# Patient Record
Sex: FEMALE | Race: OTHER | URBAN - METROPOLITAN AREA
[De-identification: names, ages, dates, MRNs, and addresses within clinical notes are randomized per-mention and may not be internally consistent; named-entity substitution may affect disease eponyms.]

---

## 2019-03-09 ENCOUNTER — OFFICE VISIT (OUTPATIENT)
Dept: FAMILY MEDICINE CLINIC | Age: 54
End: 2019-03-09

## 2019-03-09 ENCOUNTER — HOSPITAL ENCOUNTER (OUTPATIENT)
Dept: LAB | Age: 54
Discharge: HOME | End: 2019-03-09

## 2019-03-09 VITALS
WEIGHT: 121 LBS | HEIGHT: 55 IN | TEMPERATURE: 98.1 F | BODY MASS INDEX: 28 KG/M2 | SYSTOLIC BLOOD PRESSURE: 162 MMHG | HEART RATE: 58 BPM | DIASTOLIC BLOOD PRESSURE: 75 MMHG

## 2019-03-09 DIAGNOSIS — R03.0 BLOOD PRESSURE ELEVATED WITHOUT HISTORY OF HTN: Primary | ICD-10-CM

## 2019-03-09 DIAGNOSIS — R03.0 BLOOD PRESSURE ELEVATED WITHOUT HISTORY OF HTN: ICD-10-CM

## 2019-03-09 DIAGNOSIS — H26.9 CATARACT OF BOTH EYES, UNSPECIFIED CATARACT TYPE: ICD-10-CM

## 2019-03-09 DIAGNOSIS — Z13.9 ENCOUNTER FOR SCREENING: ICD-10-CM

## 2019-03-09 LAB
ALBUMIN SERPL-MCNC: 3.8 G/DL (ref 3.5–5)
ALBUMIN/GLOB SERPL: 1 {RATIO} (ref 1.1–2.2)
ALP SERPL-CCNC: 195 U/L (ref 45–117)
ALT SERPL-CCNC: 56 U/L (ref 12–78)
ANION GAP SERPL CALC-SCNC: 7 MMOL/L (ref 5–15)
AST SERPL-CCNC: 49 U/L (ref 15–37)
BASOPHILS # BLD: 0.1 K/UL (ref 0–0.1)
BASOPHILS NFR BLD: 1 % (ref 0–1)
BILIRUB SERPL-MCNC: 0.3 MG/DL (ref 0.2–1)
BUN SERPL-MCNC: 11 MG/DL (ref 6–20)
BUN/CREAT SERPL: 14 (ref 12–20)
CALCIUM SERPL-MCNC: 9.2 MG/DL (ref 8.5–10.1)
CHLORIDE SERPL-SCNC: 107 MMOL/L (ref 97–108)
CHOLEST SERPL-MCNC: 264 MG/DL
CO2 SERPL-SCNC: 27 MMOL/L (ref 21–32)
CREAT SERPL-MCNC: 0.77 MG/DL (ref 0.55–1.02)
DIFFERENTIAL METHOD BLD: NORMAL
EOSINOPHIL # BLD: 0.2 K/UL (ref 0–0.4)
EOSINOPHIL NFR BLD: 2 % (ref 0–7)
ERYTHROCYTE [DISTWIDTH] IN BLOOD BY AUTOMATED COUNT: 12.4 % (ref 11.5–14.5)
EST. AVERAGE GLUCOSE BLD GHB EST-MCNC: 148 MG/DL
GLOBULIN SER CALC-MCNC: 3.8 G/DL (ref 2–4)
GLUCOSE POC: NORMAL MG/DL
GLUCOSE SERPL-MCNC: 114 MG/DL (ref 65–100)
HBA1C MFR BLD: 6.8 % (ref 4.2–6.3)
HCT VFR BLD AUTO: 41.5 % (ref 35–47)
HDLC SERPL-MCNC: 41 MG/DL
HDLC SERPL: 6.4 {RATIO} (ref 0–5)
HGB BLD-MCNC: 13.7 G/DL (ref 11.5–16)
HGB BLD-MCNC: 14.2 G/DL
IMM GRANULOCYTES # BLD AUTO: 0 K/UL (ref 0–0.04)
IMM GRANULOCYTES NFR BLD AUTO: 0 % (ref 0–0.5)
LDLC SERPL CALC-MCNC: 148.8 MG/DL (ref 0–100)
LIPID PROFILE,FLP: ABNORMAL
LYMPHOCYTES # BLD: 3.4 K/UL (ref 0.8–3.5)
LYMPHOCYTES NFR BLD: 31 % (ref 12–49)
MCH RBC QN AUTO: 32 PG (ref 26–34)
MCHC RBC AUTO-ENTMCNC: 33 G/DL (ref 30–36.5)
MCV RBC AUTO: 97 FL (ref 80–99)
MONOCYTES # BLD: 0.6 K/UL (ref 0–1)
MONOCYTES NFR BLD: 6 % (ref 5–13)
NEUTS SEG # BLD: 6.5 K/UL (ref 1.8–8)
NEUTS SEG NFR BLD: 60 % (ref 32–75)
NRBC # BLD: 0 K/UL (ref 0–0.01)
NRBC BLD-RTO: 0 PER 100 WBC
PLATELET # BLD AUTO: 369 K/UL (ref 150–400)
PMV BLD AUTO: 9.8 FL (ref 8.9–12.9)
POTASSIUM SERPL-SCNC: 4 MMOL/L (ref 3.5–5.1)
PROT SERPL-MCNC: 7.6 G/DL (ref 6.4–8.2)
RBC # BLD AUTO: 4.28 M/UL (ref 3.8–5.2)
SODIUM SERPL-SCNC: 141 MMOL/L (ref 136–145)
TRIGL SERPL-MCNC: 371 MG/DL (ref ?–150)
TSH SERPL DL<=0.05 MIU/L-ACNC: 2.6 UIU/ML (ref 0.36–3.74)
VLDLC SERPL CALC-MCNC: 74.2 MG/DL
WBC # BLD AUTO: 10.8 K/UL (ref 3.6–11)

## 2019-03-09 PROCEDURE — 85025 COMPLETE CBC W/AUTO DIFF WBC: CPT

## 2019-03-09 PROCEDURE — 80061 LIPID PANEL: CPT

## 2019-03-09 PROCEDURE — 84443 ASSAY THYROID STIM HORMONE: CPT

## 2019-03-09 PROCEDURE — 83036 HEMOGLOBIN GLYCOSYLATED A1C: CPT

## 2019-03-09 PROCEDURE — 80053 COMPREHEN METABOLIC PANEL: CPT

## 2019-03-09 NOTE — PROGRESS NOTES
Gerson Castaneda is a 48 y.o. female    Issues discussed today include:    Chief Complaint   Patient presents with    Eye Problem     Blurry    Cold Symptoms     sore throat x3 days        1) Vision problem:  Has had blurry vision for 4 yrs, worsening. Was told she has cataracts in the past about 4 yrs ago, thinks it is \"growing. \" She had eye exam in [place], but did not have surgery. Denies eye pain or purulent discharge. Eyes do water, just started a little while ago. Is not using any eye drops or other meds for this. Has never needed glasses, not using contact lenses. 2) Sore throat:  Started 3 days ago. Associated with body aches. No known sick contacts. Denies HA, fever, nasal congestion, cough. Is eating and drinking normally. 3) Elevated BP:  Noted today. Has never before been dx'd with HTN - but only went to doctor once 4 yrs ago. No known family h/o HTN. Denies CP, SOB, leg swelling. Says her   4 yrs ago and has been feeling sad since. She has a will to live, denies SI. Is not working right now, lives with her daughter. Goes to [employer], feels she has good support network. Data reviewed or ordered today:       Other problems include: There is no problem list on file for this patient. Medications:  No current outpatient medications on file prior to visit. No current facility-administered medications on file prior to visit. Allergies:  No Known Allergies    LMP:  No LMP recorded.  Patient is postmenopausal.    Social History     Socioeconomic History    Marital status: SINGLE     Spouse name: Not on file    Number of children: Not on file    Years of education: Not on file    Highest education level: Not on file   Social Needs    Financial resource strain: Not on file    Food insecurity - worry: Not on file    Food insecurity - inability: Not on file    Transportation needs - medical: Not on file    Transportation needs - non-medical: Not on file Occupational History    Not on file   Tobacco Use    Smoking status: Never Smoker    Smokeless tobacco: Never Used   Substance and Sexual Activity    Alcohol use: No     Frequency: Never    Drug use: No    Sexual activity: Not on file   Other Topics Concern    Not on file   Social History Narrative    Not on file       No family history on file. Physical Exam   Visit Vitals  /75 (BP 1 Location: Left arm)   Pulse (!) 58   Temp 98.1 °F (36.7 °C) (Oral)   Ht 4' 6.13\" (1.375 m)   Wt 121 lb (54.9 kg)   BMI 29.03 kg/m²      BP Readings from Last 3 Encounters:   03/09/19 162/75     Constitutional: Appears well,  No acute distress, Vitals noted  Psychiatric:  Affect normal, Alert and Oriented to person/place/time  Eyes:  Conjunctiva clear aside from pterygium in left eye, no drainage, PERRL, EOMI, eyelids normal without edema, bilat lenses with clouding  ENT:  External ears and nose normal, Mucous membranes moist. TMs grey and non-bulging bilaterally. OP without erythema, edema or exudate. Bilateral nares without active drainage, edema or polyps. Neck:  General inspection normal. Supple. Heart:  Normal HR, Normal S1 and S2,  Regular rhythm. No murmurs, rubs or gallops. Lungs:  Clear to auscultation, good respiratory effort, no wheezes, rales or rhonchi  Extremities: Without edema, good peripheral pulses  Skin:  Warm to palpation, without rashes      Assessment/Plan:      ICD-10-CM ICD-9-CM    1. Blood pressure elevated without history of HTN R03.0 796.2 HEMOGLOBIN A1C WITH EAG      METABOLIC PANEL, COMPREHENSIVE      TSH 3RD GENERATION      LIPID PANEL      CBC WITH AUTOMATED DIFF   2. Cataract of both eyes, unspecified cataract type H26.9 366.9    3.  Encounter for screening Z13.9 V82.9 AMB POC GLUCOSE BLOOD, BY GLUCOSE MONITORING DEVICE      AMB POC HEMOGLOBIN (HGB)       1) Cataracts, vision impaired  - [employer] with [facility] fax # attached so they can send records/recs to see ophthalmology prn    2) High BP, no prior h/o HTN  - Pt to check BP 2-3 times weekly and keep log, bring to follow up  - Close f/u in 3 weeks for BP recheck  - Labs today, will review at f/u or sooner over phone if urgent tx needed    3) Sore throat - no fever. Exam benign. No suspicion for strep, possible viral illness    [facility] flyer for mammo and pap (last pap > 3 yrs ago in [place])      Follow-up Disposition:  Return in about 3 weeks (around 3/30/2019) for BP f/u appt.         Ace Ribera MD  [address] [place] [address]

## 2019-03-09 NOTE — PATIENT INSTRUCTIONS
Presión arterial elevada: Instrucciones de cuidado - [ Elevated Blood Pressure: Care Instructions ]  Instrucciones de cuidado    La presión arterial es amina medida de la fuerza que ejerce la mazin contra las dumont de las arterias. Es normal que la presión arterial suba y baje a lo claudia del día. Kassandra si se mantiene johnathan por un tiempo, usted tiene presión arterial johnathan. Dos números indican corado presión arterial. El primer número es la presión sistólica. Muestra qué tan jasvir presiona la mazin cuando el corazón está bombeando. El ej número es la presión diastólica. Muestra qué tan jasvir presiona la [employer] latidos, cuando el corazón está relajado y llenándose de [place]. Ferol Blow arterial ideal para adultos es menos de 120/80 (diga \"120 sobre 80\"). La presión arterial johnathan es de 140/90 o superior. Usted tiene la presión arterial johnathan si el número de [place] es 140 o superior o el número de abajo es 90 o superior, o ambas cosas. La prueba principal para la presión arterial johnathan es simple, rápida e indolora. Para diagnosticar presión arterial johnathan, corado médico examinará corado presión arterial en momentos diferentes. Después de tomarle la presión, es posible que corado médico le pida que se vuelva a noni la presión en casa. Si se le diagnostica presión arterial johnathan, puede colaborar con corado médico para elaborar un plan a claudia plazo para manejarla. La atención de seguimiento es amina parte clave de corado tratamiento y seguridad. Asegúrese de hacer y acudir a todas las citas, y llame a corado médico si está teniendo problemas. También es amina buena idea saber los resultados de los exámenes y mantener amina lista de los medicamentos que jazmin. ¿Cómo puede cuidarse en el hogar? · No fume. Fumar aumenta corado riesgo de ataque cerebral y ataque al corazón. Si necesita ayuda para dejarlo, hable con corado médico sobre programas y medicamentos para dejar de fumar.  Estos pueden aumentar isadora probabilidades de dejar de fumar para siempre. · Mantenga un peso saludable. · Trate de limitar la cantidad de sodio que ingiere a menos de 2,300 miligramos (mg) al día. Corado médico podría pedirle que trate de ingerir menos de 1,500 mg al día. · Manténgase físicamente activo. Janes al menos 30 minutos de ejercicio la mayoría de los días de la [place]. Caminar es amina buena opción. Es posible que también quiera hacer otras actividades, charlie correr, nadar, [employer], o practicar tenis o deportes de equipo. · No tome alcohol o limite la cantidad que sixto. Hable con corado médico acerca de si puede noni alcohol. · Coma abundantes frutas, verduras y productos lácteos bajos en grasa. Consuma menos grasa saturada y total.  · Aprenda a revisarse la presión arterial en corado hogar. ¿Cuándo debe pedir ayuda? Llame a corado médico ahora mismo o busque atención médica inmediata si:  ? · Corado presión arterial es mucho más johnathan de lo normal (charlie 180/110 o superior). ? · Elena que la presión arterial johnathan está causando síntomas charlie:  ¨ Dolor de jose ramon intenso. [address] ? Vigile muy de cerca los cambios en corado home, y asegúrese de comunicarse con corado médico si:  ? · No mejora charlie se esperaba. ¿Dónde puede encontrar más información en inglés? Karol Penta a [email or web address]-gaurav.[email or web address] Bi Gamez A477 en la búsqueda para aprender más acerca de \"Presión arterial elevada: Instrucciones de cuidado - [ Elevated Blood Pressure: Care Instructions ]. \"  Revisado: 21 septiembre, 2016  Versión del contenido: 11.4  © 3669-1732 Healthwise, Incorporated. Las instrucciones de cuidado fueron adaptadas bajo licencia por [employer] (which disclaims liability or warranty for this information). Si usted tiene [place] afección médica o sobre estas instrucciones, siempre pregunte a corado profesional de home. I and love and you, Incorporated niega toda garantía o responsabilidad por corado uso de esta información.        Cataratas: Instrucciones de cuidado - [ Cataracts: Care Instructions ]  Instrucciones de cuidado    La catarata es un enturbiamiento del cristalino del gilbert. El cristalino concentra la fam en la retina, en el fondo del gilbert. Las cataratas bloquean parte yun y [address] difícil que usted iman de manera angel. Con frecuencia, las cataratas se desarrollan cuando amy envejece. [address] cataratas se desarrollan lentamente. Al principio, taylor vez sólo necesite anteojos más potentes para ayudarle a sukumar mejor. Después, si las cataratas siguen desarrollándose y empiezan a afectar gravemente la vista, usted puede someterse a amina cirugía para eliminarlas. La atención de seguimiento es amina parte clave de corado tratamiento y seguridad. Asegúrese de hacer y acudir a todas las citas, y llame a corado médico si está teniendo problemas. También es amina buena idea saber los resultados de isadora exámenes y mantener amina lista de los medicamentos que jazmin. ¿Cómo puede cuidarse en el hogar? · Janes cambios en [place] habitaciones y use julisa en las ventanas para evitar el resplandor. · Use más luces o bombillas de mayor potencia. · Use amina lupa para leer. Busque libros y otros materiales de lectura impresos con letra harshad para que la lectura sea Demetris. · Hágase chequeos de los ojos regularmente y cambie isadora anteojos cuando sea necesario. · Use anteojos de sol para bloquear la fam solar dañina. Compre anteojos de sol que bloqueen los kindra ultravioleta A y B (UVA y UVB). · No fume. Fumar puede agravar las cataratas. Si necesita ayuda para dejar de fumar, hable con corado médico [employer] y medicamentos para dejar de fumar. Éstos pueden aumentar isadora probabilidades de dejar el hábito para siempre. ¿Cuándo debe pedir ayuda?   Preste especial atención a los cambios en corado home y asegúrese de comunicarse con corado médico si:    · Corado visión está empeorando.     · Debido a isadora problemas de visión, tiene más inconvenientes para hacer las tareas cotidianas, charlie conducir o leer el periódico.   ¿Dónde puede encontrar más información en inglés? Miguel Angel [place] a http://christina-gaurav.info/. Mora Henriquez P916 en la búsqueda para aprender más acerca de \"Cataratas: Instrucciones de cuidado - [ Cataracts: Care Instructions ]. \"  Revisado: 17 julio, 2018  Versión del contenido: 11.9  © 7987-3453 Healthwise, Incorporated. Las instrucciones de cuidado fueron adaptadas bajo licencia por [employer] (which disclaims liability or warranty for this information). Si usted tiene [place] afección médica o sobre estas instrucciones, siempre pregunte a corado profesional de home. Healthwise, Incorporated niega toda garantía o responsabilidad por corado uso de esta información.

## 2019-03-09 NOTE — PROGRESS NOTES
Results for orders placed or performed in visit on 03/09/19   AMB POC GLUCOSE BLOOD, BY GLUCOSE MONITORING DEVICE   Result Value Ref Range    Glucose POC nf 120 mg/dL   AMB POC HEMOGLOBIN (HGB)   Result Value Ref Range    Hemoglobin (POC) 14.2          PATIENT CANNOT READ OR WRITE. WE TRIED THE EYE CHART WITH THE SHAPES AND IT WAS DIFFICULT BUT WE GOT IT DONE.

## 2019-03-09 NOTE — PROGRESS NOTES
At discharge station AVS was printed and reviewed with pt with Joaquina as . Reviewed and discussed the following as written in provider check out note copied below:  Ca yusuf     Check-out Note: 1) Cataracts, vision impaired   - Optometry resource page with [facility] fax # attached so they can send records/recs to see ophthalmology prn     2) High BP, no prior h/o HTN   - Pt to check BP 2-3 times weekly and keep log, bring to follow up   - Close f/u in 3 weeks for BP recheck   - Labs today, will review at f/u or sooner over phone if urgent tx needed     [facility] flyer for mammo and pap (last pap > 3 yrs ago in [place])   Follow-up and Disposition History   Instrucciones para seguir     Return in about 3 weeks (around 3/30/2019) for BP f/u appt. Pt can not check BP unless it is on Saturday when she has a ride to [place] with her daughter. Pt does not read or write. She will aim to check bp on Saturdays. Provided pt with information and phone # for [facility]. Explained that they will do a financial screening before scheduling appt. Gave eye resources.  Ruel Lafleur RN

## 2019-03-14 NOTE — PROGRESS NOTES
I attempted to call pt to discuss results. Someone else answered the phone and said pt would be available after 4pm. No PHI doc on file, so I'll try back    A1c 6.8%, new dx of DMII in 50yo female  ** Work on diet and exercise, weight loss. Avoid concentrated sweets, no soda. Aim for 20 minutes of aerobic exercise daily or 150 minutes weekly. ** Start metformin 500mg XL once daily with food (rx sent). May cause loose stools or nausea initially, but this usually resolved after 1-2 weeks    Lipid panel - Total cholesterol (264) and LDL (149) high. 10 yr ASCVD risk 9.2% using elevated BP from [facility].  ** Recommend healthy diet, low is saturated fat and 150 mins of aerobic exercise weekly  ** If BP remains elevated at 4/1/19 follow up, then pt will likely need to start HTN and cholesterol medicine    CMP no kidney or electrolyte problems identified, but mild elevation of AST and alk phos. ** Will ensure pt not drinking excessive etoh or taking tylenol on reg basis. Will monitor.     CBC normal, no anemia or sign of infection   TSH wnl, no evidence of thyroid disease

## 2019-03-21 NOTE — PROGRESS NOTES
I called pt for third time in attempt to relay lab results - no answer, so left VM asking pt call us back to discuss results   Will plan to discuss with pt on 4/1/19 appt, but sending to call back nurse in case pt calls office to discuss results sooner